# Patient Record
Sex: FEMALE | Race: WHITE
[De-identification: names, ages, dates, MRNs, and addresses within clinical notes are randomized per-mention and may not be internally consistent; named-entity substitution may affect disease eponyms.]

---

## 2018-01-29 ENCOUNTER — HOSPITAL ENCOUNTER (EMERGENCY)
Dept: HOSPITAL 8 - ED | Age: 23
Discharge: HOME | End: 2018-01-29
Payer: MEDICAID

## 2018-01-29 VITALS — HEIGHT: 63 IN | WEIGHT: 156.53 LBS | BODY MASS INDEX: 27.73 KG/M2

## 2018-01-29 VITALS — DIASTOLIC BLOOD PRESSURE: 55 MMHG | SYSTOLIC BLOOD PRESSURE: 103 MMHG

## 2018-01-29 DIAGNOSIS — N30.90: Primary | ICD-10-CM

## 2018-01-29 LAB
ALBUMIN SERPL-MCNC: 3.9 G/DL (ref 3.4–5)
ALP SERPL-CCNC: 85 U/L (ref 45–117)
ALT SERPL-CCNC: 27 U/L (ref 12–78)
ANION GAP SERPL CALC-SCNC: 9 MMOL/L (ref 5–15)
BASOPHILS # BLD AUTO: 0.03 X10^3/UL (ref 0–0.1)
BASOPHILS NFR BLD AUTO: 0 % (ref 0–1)
BILIRUB SERPL-MCNC: 0.4 MG/DL (ref 0.2–1)
CALCIUM SERPL-MCNC: 8.8 MG/DL (ref 8.5–10.1)
CHLORIDE SERPL-SCNC: 105 MMOL/L (ref 98–107)
CREAT SERPL-MCNC: 0.88 MG/DL (ref 0.55–1.02)
CULTURE INDICATED?: YES
EOSINOPHIL # BLD AUTO: 0.13 X10^3/UL (ref 0–0.4)
EOSINOPHIL NFR BLD AUTO: 2 % (ref 1–7)
ERYTHROCYTE [DISTWIDTH] IN BLOOD BY AUTOMATED COUNT: 13.3 % (ref 9.6–15.2)
LYMPHOCYTES # BLD AUTO: 2.19 X10^3/UL (ref 1–3.4)
LYMPHOCYTES NFR BLD AUTO: 27 % (ref 22–44)
MCH RBC QN AUTO: 30.4 PG (ref 27–34.8)
MCHC RBC AUTO-ENTMCNC: 34.2 G/DL (ref 32.4–35.8)
MCV RBC AUTO: 89 FL (ref 80–100)
MD: NO
MICROSCOPIC: (no result)
MONOCYTES # BLD AUTO: 0.56 X10^3/UL (ref 0.2–0.8)
MONOCYTES NFR BLD AUTO: 7 % (ref 2–9)
NEUTROPHILS # BLD AUTO: 5.13 X10^3/UL (ref 1.8–6.8)
NEUTROPHILS NFR BLD AUTO: 64 % (ref 42–75)
PLATELET # BLD AUTO: 306 X10^3/UL (ref 130–400)
PMV BLD AUTO: 8.9 FL (ref 7.4–10.4)
PROT SERPL-MCNC: 8.2 G/DL (ref 6.4–8.2)
RBC # BLD AUTO: 4.79 X10^6/UL (ref 3.82–5.3)

## 2018-01-29 PROCEDURE — 87086 URINE CULTURE/COLONY COUNT: CPT

## 2018-01-29 PROCEDURE — 84703 CHORIONIC GONADOTROPIN ASSAY: CPT

## 2018-01-29 PROCEDURE — 81001 URINALYSIS AUTO W/SCOPE: CPT

## 2018-01-29 PROCEDURE — 36415 COLL VENOUS BLD VENIPUNCTURE: CPT

## 2018-01-29 PROCEDURE — 76830 TRANSVAGINAL US NON-OB: CPT

## 2018-01-29 PROCEDURE — 85025 COMPLETE CBC W/AUTO DIFF WBC: CPT

## 2018-01-29 PROCEDURE — 99285 EMERGENCY DEPT VISIT HI MDM: CPT

## 2018-01-29 PROCEDURE — 74177 CT ABD & PELVIS W/CONTRAST: CPT

## 2018-01-29 PROCEDURE — 80053 COMPREHEN METABOLIC PANEL: CPT

## 2019-10-18 ENCOUNTER — HOSPITAL ENCOUNTER (EMERGENCY)
Facility: MEDICAL CENTER | Age: 24
End: 2019-10-18
Attending: EMERGENCY MEDICINE
Payer: MEDICAID

## 2019-10-18 ENCOUNTER — APPOINTMENT (OUTPATIENT)
Dept: RADIOLOGY | Facility: MEDICAL CENTER | Age: 24
End: 2019-10-18
Attending: EMERGENCY MEDICINE
Payer: MEDICAID

## 2019-10-18 VITALS
BODY MASS INDEX: 26.6 KG/M2 | HEART RATE: 82 BPM | DIASTOLIC BLOOD PRESSURE: 74 MMHG | RESPIRATION RATE: 16 BRPM | TEMPERATURE: 98.4 F | WEIGHT: 150.13 LBS | OXYGEN SATURATION: 97 % | SYSTOLIC BLOOD PRESSURE: 117 MMHG | HEIGHT: 63 IN

## 2019-10-18 DIAGNOSIS — R06.02 SHORTNESS OF BREATH: ICD-10-CM

## 2019-10-18 PROCEDURE — 94640 AIRWAY INHALATION TREATMENT: CPT

## 2019-10-18 PROCEDURE — 71046 X-RAY EXAM CHEST 2 VIEWS: CPT

## 2019-10-18 PROCEDURE — 700101 HCHG RX REV CODE 250

## 2019-10-18 PROCEDURE — 99284 EMERGENCY DEPT VISIT MOD MDM: CPT

## 2019-10-18 PROCEDURE — 94760 N-INVAS EAR/PLS OXIMETRY 1: CPT

## 2019-10-18 RX ADMIN — ALBUTEROL SULFATE 2.5 MG: 2.5 SOLUTION RESPIRATORY (INHALATION) at 16:52

## 2019-10-18 SDOH — HEALTH STABILITY: MENTAL HEALTH: HOW OFTEN DO YOU HAVE A DRINK CONTAINING ALCOHOL?: 2-4 TIMES A MONTH

## 2019-10-18 SDOH — HEALTH STABILITY: MENTAL HEALTH: HOW MANY STANDARD DRINKS CONTAINING ALCOHOL DO YOU HAVE ON A TYPICAL DAY?: 1 OR 2

## 2019-10-18 ASSESSMENT — LIFESTYLE VARIABLES: DO YOU DRINK ALCOHOL: NO

## 2019-10-18 NOTE — ED TRIAGE NOTES
Chief Complaint   Patient presents with   • Difficulty Breathing     Pt was at work and inhaled dust, throat was itching, felt like it was closing. Pt took rhinocort which significant improved symptoms. Speaking in full sentences in triage, no visible respritory distress.

## 2019-10-18 NOTE — FLOWSHEET NOTE
Nebulizer given in ER     10/18/19 8684   RT Assessment of Delivered Medications   Evaluation of Medication Delivery Daily Yes-- Pt /Family has been Instructed in use of Respiratory Medications and Adverse Reactions   SVN Group   #SVN Performed Yes   Given By: Mouthpiece   Chest Exam   Respiration 18   Pulse 75   Breath Sounds   RUL Breath Sounds Clear;Diminished   RML Breath Sounds Diminished   RLL Breath Sounds Diminished   ADAMA Breath Sounds Clear;Diminished   LLL Breath Sounds Diminished   Secretions   Cough Non Productive   Oxygen   Pulse Oximetry 96 %   O2 Daily Delivery Respiratory  Room Air with O2 Available

## 2019-10-18 NOTE — ED PROVIDER NOTES
ED Provider Note    CHIEF COMPLAINT  Chief Complaint   Patient presents with   • Difficulty Breathing     Pt was at work and inhaled dust, throat was itching, felt like it was closing. Pt took rhinocort which significant improved symptoms. Speaking in full sentences in triage, no visible respritory distress.        HPI  Radha Edwards is a 24 y.o. female who presents stating that while she was at work today she inhaled some dust which immediately caused her to cough and feel dyspneic.  She took a nasal inhaler and felt much better.  She denies chest pain.  Patient denies rash.  She denies exposure to any other new medication or other substance    REVIEW OF SYSTEMS  See HPI for further details.  No fever no chills.  No chest pain.  Positive dyspnea.  No nausea or vomiting.  All other systems are negative.    PAST MEDICAL HISTORY  History reviewed. No pertinent past medical history.    FAMILY HISTORY  History reviewed. No pertinent family history.    SOCIAL HISTORY  Social History     Socioeconomic History   • Marital status: Single     Spouse name: Not on file   • Number of children: Not on file   • Years of education: Not on file   • Highest education level: Not on file   Occupational History   • Not on file   Social Needs   • Financial resource strain: Not on file   • Food insecurity:     Worry: Not on file     Inability: Not on file   • Transportation needs:     Medical: Not on file     Non-medical: Not on file   Tobacco Use   • Smoking status: Never Smoker   • Smokeless tobacco: Never Used   Substance and Sexual Activity   • Alcohol use: Yes     Frequency: 2-4 times a month     Drinks per session: 1 or 2   • Drug use: Yes     Types: Inhaled     Comment: seth    • Sexual activity: Not on file   Lifestyle   • Physical activity:     Days per week: Not on file     Minutes per session: Not on file   • Stress: Not on file   Relationships   • Social connections:     Talks on phone: Not on file     Gets together:  "Not on file     Attends Faith service: Not on file     Active member of club or organization: Not on file     Attends meetings of clubs or organizations: Not on file     Relationship status: Not on file   • Intimate partner violence:     Fear of current or ex partner: Not on file     Emotionally abused: Not on file     Physically abused: Not on file     Forced sexual activity: Not on file   Other Topics Concern   • Not on file   Social History Narrative   • Not on file       SURGICAL HISTORY  History reviewed. No pertinent surgical history.    CURRENT MEDICATIONS  Home Medications     Reviewed by Desiree Belle (Pharmacy Tech) on 10/18/19 at 1708  Med List Status: Complete   Medication Last Dose Status   etonogestrel (NEXPLANON) 68 MG Implant implant CONT Active                ALLERGIES  No Known Allergies    PHYSICAL EXAM  VITAL SIGNS: /85   Pulse 75   Temp 36.8 °C (98.2 °F) (Temporal)   Resp 18   Ht 1.6 m (5' 3\")   Wt 68.1 kg (150 lb 2.1 oz)   LMP 09/04/2019 (Within Days)   SpO2 96%   BMI 26.59 kg/m²   Constitutional: Well developed, Well nourished, No acute distress,  HENT: Normocephalic, Atraumatic, Bilateral external ears normal, Oropharynx moist, No oral exudates,  Eyes: DEYSI, EOMI, Conjunctiva normal, No discharge.   Neck: Normal range of motion, No tenderness, Supple, No stridor. No nuchal rigidity  Lymphatic: No lymphadenopathy noted.   Cardiovascular: Regular rate and rhythm  Thorax & Lungs: Minimal expiratory wheezing  Abdomen: Bowel sounds normal, Soft, No tenderness,   Skin: Warm, Dry, No erythema, No rash.   Back: No tenderness, No CVA tenderness.   Extremities: No edema, No tenderness, No cyanosis, No clubbing. Dorsalis pedis pulses 2+ equal bilaterally. Radial pulses 2+ equal bilaterally  Neurologic: Alert & oriented x 3, Normal motor function, Normal sensory function, No focal deficits noted.         RADIOLOGY/PROCEDURES  DX-CHEST-2 VIEWS   Final Result      No " radiographic evidence of acute cardiopulmonary process.            COURSE & MEDICAL DECISION MAKING  Pertinent Labs & Imaging studies reviewed. (See chart for details)  Differential diagnosis includes allergic reaction, pneumothorax, anxiety reaction.    PERC criteria are negative.  Pulse oximetry reading was 95% on room air which is normal.  Pulse was 89.    Patient was better after albuterol nebulizer.  I recommended following up with her regular physician as she may benefit from an albuterol MDI if she has multiple similar visits      FINAL IMPRESSION  1.  Dyspnea  2.   3.      Please note that this dictation was created using voice recognition software. I have worked with consultants from the vendor as well as technical experts from [employer] [employer] to optimize the interface. I have made every reasonable attempt to correct obvious errors, but I expect that there are errors of grammar and possibly content that I did not discover before finalizing the note.      Electronically signed by: Wallace Vazquez, 10/18/2019 4:41 PM

## 2019-10-19 NOTE — ED NOTES
Pt alert and oriented, NAD, VSS, patient verbalized unbderstanding of dc instructions and follow up.

## 2019-11-30 ENCOUNTER — HOSPITAL ENCOUNTER (EMERGENCY)
Facility: MEDICAL CENTER | Age: 24
End: 2019-11-30
Payer: MEDICAID

## 2019-11-30 VITALS
BODY MASS INDEX: 26.17 KG/M2 | SYSTOLIC BLOOD PRESSURE: 127 MMHG | HEIGHT: 63 IN | HEART RATE: 111 BPM | DIASTOLIC BLOOD PRESSURE: 80 MMHG | OXYGEN SATURATION: 97 % | WEIGHT: 147.71 LBS | RESPIRATION RATE: 18 BRPM | TEMPERATURE: 98 F

## 2019-11-30 PROCEDURE — 302449 STATCHG TRIAGE ONLY (STATISTIC)

## 2019-11-30 NOTE — ED TRIAGE NOTES
"Presents complaining of bloody sputum recurring since this AM.  She admits to \"drinking\" last night.  .  Chief Complaint   Patient presents with   • Blood in Sputum   • Nausea     /80   Pulse (!) 111   Temp 36.7 °C (98 °F) (Temporal)   Resp 18   Ht 1.6 m (5' 3\")   Wt 67 kg (147 lb 11.3 oz)   SpO2 97%   BMI 26.17 kg/m²     "

## 2019-11-30 NOTE — ED NOTES
Rounding has been completed.  Pt is made aware that a room will be provided as soon as it becomes available.  Denies any changes since last evaluation.

## 2020-10-31 ENCOUNTER — HOSPITAL ENCOUNTER (EMERGENCY)
Facility: MEDICAL CENTER | Age: 25
End: 2020-10-31
Attending: EMERGENCY MEDICINE

## 2020-10-31 VITALS
BODY MASS INDEX: 23.66 KG/M2 | WEIGHT: 142 LBS | SYSTOLIC BLOOD PRESSURE: 108 MMHG | HEIGHT: 65 IN | HEART RATE: 86 BPM | OXYGEN SATURATION: 100 % | RESPIRATION RATE: 15 BRPM | DIASTOLIC BLOOD PRESSURE: 66 MMHG | TEMPERATURE: 97.6 F

## 2020-10-31 DIAGNOSIS — F19.10 POLYSUBSTANCE ABUSE (HCC): ICD-10-CM

## 2020-10-31 DIAGNOSIS — F10.929 ALCOHOLIC INTOXICATION WITH COMPLICATION (HCC): ICD-10-CM

## 2020-10-31 PROCEDURE — 99283 EMERGENCY DEPT VISIT LOW MDM: CPT

## 2020-10-31 NOTE — ED NOTES
Pt alert and oriented at this time. Reviewed AVS with pt. Pt ambulatory at this time. Appears in no distress. Pt called ride to come get her

## 2020-10-31 NOTE — DISCHARGE INSTRUCTIONS
You accidentally overdosed on a number of recreational drugs  Please understand that you could have  today and I would avoid substance abuse in the future

## 2020-10-31 NOTE — ED PROVIDER NOTES
"ED Provider Note    Scribed for Varinder Aguillon M.D. by Kassie Thomas. 10/31/2020  6:31 AM    Primary care provider: None  Means of arrival: Ambulance  History obtained from: Patient  History limited by: Mental Status Change; Alcohol Intoxication    CHIEF COMPLAINT  •  Alcohol Intoxication    HPI  Radha Sanders is a 25 y.o. female who presents to the Emergency Department by ambulance for alcohol intoxication with an onset of 1 day. Per the patient's sister, the patient went out with friends last night. An ambulance was called as the patient was found lethargic but arousable to voice. She reportedly consumed a significant amount of alcohol as well as methamphetamines, yuri and marijuana. Further medical history is unobtainable secondary to mental status change and alcohol intoxication.      REVIEW OF SYSTEMS  ROS unobtainable secondary to mental status change and alcohol intoxication. See HPI for further details.       PAST MEDICAL HISTORY  No major medical history per sister.      SURGICAL HISTORY  No recent surgeries.      SOCIAL HISTORY  Social History     Tobacco Use   • Smoking status: Never Smoker   • Smokeless tobacco: Never Used   Substance Use Topics   • Alcohol use: Yes     Frequency: 2-4 times a month     Drinks per session: 1 or 2   • Drug use: Yes     Types: Inhaled     Comment: [email or web address]       Social History     Substance and Sexual Activity   Drug Use Yes   • Types: Inhaled    Comment: seth    Accompanied by sister.      FAMILY HISTORY  History reviewed. No pertinent family history.       CURRENT MEDICATIONS  Home Medications    **Home medications have not yet been reviewed for this encounter**         ALLERGIES  None      PHYSICAL EXAM  VITAL SIGNS: /69   Pulse 95   Temp 36.4 °C (97.6 °F) (Temporal)   Resp 15   Ht 1.651 m (5' 5\")   Wt 64.4 kg (142 lb)   SpO2 95%   BMI 23.63 kg/m²       Constitutional: Smells of alcohol, Intoxicated, No acute distress.  HENT: " Normocephalic, Atraumatic, Bilateral external ears normal, Oropharynx is clear mucous membranes are moist. No oral exudates or nasal discharge.   Eyes: Pupils are equal round and reactive, EOMI, Conjunctiva normal, No discharge.   Neck: Normal range of motion, No tenderness, Supple, No stridor. No meningismus.  Lymphatic: No lymphadenopathy noted.   Cardiovascular: Regular rate and rhythm without murmur rub or gallop.  Thorax & Lungs: Clear breath sounds bilaterally without wheezes, rhonchi or rales. There is no chest wall tenderness.   Abdomen: Soft non-tender non-distended. There is no rebound or guarding. No organomegaly is appreciated. Bowel sounds are normal.  Skin: Normal without rash.   Back: No CVA or spinal tenderness.   Extremities: Intact distal pulses, No edema, No tenderness, No cyanosis, No clubbing. Capillary refill is less than 2 seconds.  Musculoskeletal: Good range of motion in all major joints. No tenderness to palpation or major deformities noted.   Neurologic: Drowsy, Intoxicated, Able to follow commands and sit forward, Normal motor function, Normal sensory function, No focal deficits noted. Reflexes are normal.  Psychiatric: Intoxicated. Difficult to assess.      COURSE & MEDICAL DECISION MAKING  Nursing notes, VS, PMSFHx reviewed in chart.    6:31 AM Patient seen and examined at bedside. Plan to continue monitoring until sober.  She does not exhibit anything more than a toxidrome more associated with alcohol then stimulants and she is able to follow commands and reposition herself and set up to take a drink of juice    10:21 AM On repeat evaluation, patient is resting comfortably. She is arousable to voice. She is sober and speaking in full sentences. Plan to contact the patient's sister and discharge the patient home.  Patient is much improved and is counseled to stop using so much alcohol and drugs    Discharge plan was discussed with the patient and includes following up with her PCP. The  "patient will return for new or persisting symptoms.  The patient verbalizes understanding and will comply.  Patient is stable at the time of discharge. Vital signs were reviewed: /60   Pulse 91   Temp 36.4 °C (97.6 °F) (Temporal)   Resp 15   Ht 1.651 m (5' 5\")   Wt 64.4 kg (142 lb)   SpO2 100%   BMI 23.63 kg/m²        DISPOSITION  Patient will be discharged home in stable condition.      FINAL IMPRESSION  1. Alcoholic intoxication with complication (HCC)    2. Polysubstance abuse (HCC)         Kassie BERMEO (Scribe), am scribing for, and in the presence of, Varinder Aguillon M.D..    Electronically signed by: Kassie Thomas (Scribe), 10/31/2020    Varinder BERMEO M.D. personally performed the services described in this documentation, as scribed by Kassie Thomas in my presence, and it is both accurate and complete. C.      The note accurately reflects work and decisions made by me.  Varinder Aguillon M.D.  10/31/2020  10:37 AM        "

## 2023-07-14 ENCOUNTER — HOSPITAL ENCOUNTER (EMERGENCY)
Facility: MEDICAL CENTER | Age: 28
End: 2023-07-14
Attending: EMERGENCY MEDICINE
Payer: MEDICAID

## 2023-07-14 VITALS
OXYGEN SATURATION: 99 % | TEMPERATURE: 96.8 F | WEIGHT: 146.83 LBS | HEART RATE: 93 BPM | DIASTOLIC BLOOD PRESSURE: 91 MMHG | BODY MASS INDEX: 26.02 KG/M2 | RESPIRATION RATE: 18 BRPM | HEIGHT: 63 IN | SYSTOLIC BLOOD PRESSURE: 136 MMHG

## 2023-07-14 DIAGNOSIS — K04.7 DENTAL INFECTION: ICD-10-CM

## 2023-07-14 PROCEDURE — A9270 NON-COVERED ITEM OR SERVICE: HCPCS | Performed by: EMERGENCY MEDICINE

## 2023-07-14 PROCEDURE — 700102 HCHG RX REV CODE 250 W/ 637 OVERRIDE(OP): Performed by: EMERGENCY MEDICINE

## 2023-07-14 PROCEDURE — 99283 EMERGENCY DEPT VISIT LOW MDM: CPT

## 2023-07-14 RX ORDER — OXYCODONE HYDROCHLORIDE AND ACETAMINOPHEN 5; 325 MG/1; MG/1
1 TABLET ORAL ONCE
Status: COMPLETED | OUTPATIENT
Start: 2023-07-14 | End: 2023-07-14

## 2023-07-14 RX ORDER — AMOXICILLIN AND CLAVULANATE POTASSIUM 875; 125 MG/1; MG/1
1 TABLET, FILM COATED ORAL ONCE
Status: COMPLETED | OUTPATIENT
Start: 2023-07-14 | End: 2023-07-14

## 2023-07-14 RX ORDER — DEXAMETHASONE 4 MG/1
8 TABLET ORAL ONCE
Status: COMPLETED | OUTPATIENT
Start: 2023-07-14 | End: 2023-07-14

## 2023-07-14 RX ORDER — AMOXICILLIN AND CLAVULANATE POTASSIUM 875; 125 MG/1; MG/1
1 TABLET, FILM COATED ORAL 2 TIMES DAILY
Qty: 14 TABLET | Refills: 0 | Status: ACTIVE | OUTPATIENT
Start: 2023-07-14 | End: 2023-07-21

## 2023-07-14 RX ADMIN — DEXAMETHASONE 8 MG: 4 TABLET ORAL at 18:10

## 2023-07-14 RX ADMIN — OXYCODONE HYDROCHLORIDE AND ACETAMINOPHEN 1 TABLET: 5; 325 TABLET ORAL at 18:10

## 2023-07-14 RX ADMIN — AMOXICILLIN AND CLAVULANATE POTASSIUM 1 TABLET: 875; 125 TABLET, FILM COATED ORAL at 18:10

## 2023-07-15 NOTE — ED NOTES
Discharge instructions reviewed with patient and signed. IV was removed from arm. They were instructed on how to  prescriptions. They verbalized understanding of follow up instructions. All belongings with patient. They ambulate with a steady gait. Significant other is driving her home

## 2023-07-15 NOTE — ED PROVIDER NOTES
"ED Provider Note    CHIEF COMPLAINT  Chief Complaint   Patient presents with    Dental Pain     Pt reports her R front tooth is broken and she has had gum/inner lip swelling/pain since last night.  Pt taking IBU for pain, last dose 0900 this morning.       EXTERNAL RECORDS REVIEWED  No old notes for evaluation    HPI/ROS      Radha Sanders is a 28 y.o. female who presents planing of right upper tooth dental pain.  Its in the middle of her mouth and is central incisor to the right with pain radiation by here Jaime up by her eye.  She states he thinks he has an infection.  Had a broken tooth while she was in detention and they pulled it out before and she states this feels similar.  Denies fever, shakes, chills, sweats, difficulty swallowing or current dental care.    PAST MEDICAL HISTORY       SURGICAL HISTORY  patient denies any surgical history    FAMILY HISTORY  No family history on file.    SOCIAL HISTORY  Social History     Tobacco Use    Smoking status: Never    Smokeless tobacco: Never   Vaping Use    Vaping Use: Never used   Substance and Sexual Activity    Alcohol use: Yes    Drug use: Yes     Types: Inhaled     Comment: seth     Sexual activity: Not on file       CURRENT MEDICATIONS  Home Medications       Reviewed by Nikki Ortega R.N. (Registered Nurse) on 07/14/23 at 1728  Med List Status: Complete     Medication Last Dose Status        Patient Tyler Taking any Medications                           ALLERGIES  No Known Allergies    PHYSICAL EXAM  VITAL SIGNS: BP (!) 136/91   Pulse 93   Temp 36 °C (96.8 °F)   Resp 18   Ht 1.6 m (5' 3\")   Wt 66.6 kg (146 lb 13.2 oz)   LMP 07/08/2023 (Approximate)   SpO2 99%   BMI 26.01 kg/m²      Constitutional: Well developed, Well nourished, No acute distress, Non-toxic appearance.   HENT: Normocephalic, Atraumatic, or dentition with the central incisor and pericentral incisor slight cracking of the tooth with evidence of slight surrounding " erythema, no discharge, no abscess Eyes: PERRLA, EOMI, Conjunctiva normal, No discharge.   Skin: Warm, Dry, No erythema, No rash.   Neurologic: Alert & oriented x 3,  No focal deficits noted, normal gait.      DIAGNOSTIC STUDIES / PROCEDURES      COURSE & MEDICAL DECISION MAKING    ED Observation Status? No; Patient does not meet criteria for ED Observation.     INITIAL ASSESSMENT, COURSE AND PLAN  Care Narrative: This is a pleasant 28-year-old female presents with dental infection and dental caries.  The patient has no evidence of abscess requiring I&D, no active cellulitis, no ocular involvement.  She received Percocet here, Augmentin and Decadron.  I prescribed her Augmentin for outpatient care.  She understands the need to follow-up with a dentist for further evaluation and possible dental removal.  Strict return precautions have been given.  She has no evidence of respiratory compromise.        ADDITIONAL PROBLEM LIST  Previous methamphetamine use resulting in poor dentition  DISPOSITION AND DISCUSSIONS      Escalation of care considered, and ultimately not performed:acute inpatient care management, however at this time, the patient is most appropriate for outpatient management    Barriers to care at this time, including but not limited to: Patient does not have established PCP.     Decision tools and prescription drugs considered including, but not limited to: Consider Panorex or CT scan maxillofacial but here the patient has no evidence of abscess requiring further radiological evaluation.    FINAL DIAGNOSIS  1. Dental infection        DISPOSITION:  Patient will be discharged home in stable condition.    FOLLOW UP:  [facility], Emergency Dept  [address]  [facility] [address] [phone]    If symptoms worsen    On license of [facility]  [address] [facility] [address]  [facility] [address]  Schedule an appointment as soon as possible for a visit         OUTPATIENT MEDICATIONS:  Discharge  Medication List as of 7/14/2023  6:28 PM        START taking these medications    Details   amoxicillin-clavulanate (AUGMENTIN) 875-125 MG Tab Take 1 Tablet by mouth 2 times a day for 7 days., Disp-14 Tablet, R-0, Normal               Electronically signed by: Ghassan Zepeda D.O., 7/14/2023 5:49 PM

## 2023-07-15 NOTE — ED TRIAGE NOTES
"Radha Edwards Sanders ambulatory to triage for     Chief Complaint   Patient presents with    Dental Pain     Pt reports her R front tooth is broken and she has had gum/inner lip swelling/pain since last night.  Pt taking IBU for pain, last dose 0900 this morning.     Pt requesting dental referral.  NAD, VSS.  BP (!) 143/87   Pulse 98   Temp 36 °C (96.8 °F) (Temporal)   Resp 18   Ht 1.6 m (5' 3\")   Wt 66.6 kg (146 lb 13.2 oz)   LMP 07/08/2023 (Approximate)   SpO2 99%   BMI 26.01 kg/m²     "

## 2023-07-15 NOTE — DISCHARGE INSTRUCTIONS
Follow up with a dentist is mandatory.  Take all of the antibiotics.  OTC Ibuprofen. Return to [facility] on [facility] as we do not have ENT or Oral Maxillofacial Surgeon on call this facility for fever over 101.5, significant facial swelling, difficulty breathing or swallowing or inability to open the mouth fully.    Dental Caries   Dental caries (tooth decay) is the most common of all oral diseases. It is particularly important to deal with it from your child's 1st to 12th year of life. In these years, the baby teeth erupt and are replaced by the permanent teeth. The permanent teeth, excluding the 3rd molars, erupt (come out) into their working position.  For early treatment of dental caries, your child should get their first dental checkup between one and one-half and two years of age. This is important before any extensive cavities are established.  Dental caries often appears as a white chalky area on the enamel. It later softens, and then the tooth structure breaks down. If not treated right away, it progresses towards the pulp. It will then require more extensive treatment to save the tooth.  PREVENTION  In children, dentistry is mainly used for the prevention of dental cavities.   Sealants can help with prevention of cavities. Sealants are composite resins applied onto surfaces of teeth at risk for decay. They smooth out the fissures and pits, and prevent food entrapment that causes decay.   Fluoride tablets may also be prescribed in the children over 3 years of age, if your drinking water is not fluoridated. The fluoride absorbed by the tooth enamel makes them less susceptible to caries.   Thorough daily cleaning with a toothbrush and dental floss is the best way to prevent cavities.   Regular visits with a dentist for check-ups and cleanings is also very important.   SEEK IMMEDIATE MEDICAL ATTENTION IF:  You develop a fever over 102° F (38° C).   You develop redness and swelling of  your face, jaw, or neck.   You are unable to open your mouth.   You have severe pain uncontrolled by pain medicine.   Document Released: 09/09/2003 Document Re-Released: 03/14/2011  [employer]® Patient Information ©2011 [employer], [place].

## 2023-07-16 ENCOUNTER — HOSPITAL ENCOUNTER (EMERGENCY)
Facility: MEDICAL CENTER | Age: 28
End: 2023-07-16
Attending: EMERGENCY MEDICINE
Payer: MEDICAID

## 2023-07-16 VITALS
TEMPERATURE: 98 F | HEIGHT: 63 IN | SYSTOLIC BLOOD PRESSURE: 130 MMHG | WEIGHT: 142 LBS | RESPIRATION RATE: 17 BRPM | OXYGEN SATURATION: 98 % | DIASTOLIC BLOOD PRESSURE: 85 MMHG | BODY MASS INDEX: 25.16 KG/M2 | HEART RATE: 80 BPM

## 2023-07-16 DIAGNOSIS — K04.7 PERIAPICAL ABSCESS: ICD-10-CM

## 2023-07-16 PROCEDURE — 700102 HCHG RX REV CODE 250 W/ 637 OVERRIDE(OP): Mod: UD | Performed by: EMERGENCY MEDICINE

## 2023-07-16 PROCEDURE — 99283 EMERGENCY DEPT VISIT LOW MDM: CPT

## 2023-07-16 PROCEDURE — A9270 NON-COVERED ITEM OR SERVICE: HCPCS | Mod: UD | Performed by: EMERGENCY MEDICINE

## 2023-07-16 PROCEDURE — 303977 HCHG I & D

## 2023-07-16 RX ORDER — OXYCODONE HYDROCHLORIDE AND ACETAMINOPHEN 5; 325 MG/1; MG/1
1 TABLET ORAL ONCE
Status: COMPLETED | OUTPATIENT
Start: 2023-07-16 | End: 2023-07-16

## 2023-07-16 RX ADMIN — BENZOCAINE, BUTAMBEN, AND TETRACAINE HYDROCHLORIDE 1 SPRAY: .028; .004; .004 AEROSOL, SPRAY TOPICAL at 17:48

## 2023-07-16 RX ADMIN — OXYCODONE AND ACETAMINOPHEN 1 TABLET: 5; 325 TABLET ORAL at 17:48

## 2023-07-17 NOTE — DISCHARGE INSTRUCTIONS
To the antibiotics.  Take Motrin and Tylenol as needed for pain control.  Follow-up with the dentist as discussed.  Return for increased pain or swelling

## 2023-07-17 NOTE — ED PROVIDER NOTES
"ED Provider Note    CHIEF COMPLAINT  Chief Complaint   Patient presents with    Dental Pain     Front upper tooth the started 4 days ago  She came in 3 days ago and was prescribed antibiotic    But the swelling of the upper lip got worsen and the pain increased and radiating to the nasal area  Denied any fever       HPI/ROS    Radha Sanders is a 28 y.o. female who presents with dental pain.  The patient states that started out 4 days ago.  She was evaluated here on the 14th and I reviewed the record and she was started on Augmentin.  She had increased pain and swelling to the upper lip as well as the upper gingival region around the upper incisors.  She has not had any associated fevers nor vomiting.    PAST MEDICAL HISTORY       SURGICAL HISTORY  patient denies any surgical history    FAMILY HISTORY  History reviewed. No pertinent family history.    SOCIAL HISTORY  Social History     Tobacco Use    Smoking status: Never    Smokeless tobacco: Never   Vaping Use    Vaping Use: Never used   Substance and Sexual Activity    Alcohol use: Yes    Drug use: Yes     Types: Inhaled     Comment: seth     Sexual activity: Not on file       CURRENT MEDICATIONS  Home Medications       Reviewed by Gregorio Arias R.N. (Registered Nurse) on 07/16/23 at 1725  Med List Status: Partial     Medication Last Dose Status   amoxicillin-clavulanate (AUGMENTIN) 875-125 MG Tab  Active                    ALLERGIES  No Known Allergies    PHYSICAL EXAM  VITAL SIGNS: /86   Pulse 92   Temp 36.7 °C (98.1 °F) (Temporal)   Resp 18   Ht 1.6 m (5' 3\")   Wt 64.4 kg (142 lb)   LMP 07/08/2023 (Approximate)   SpO2 98%   BMI 25.15 kg/m²    In general the patient does not appear toxic    Oral cavity the patient does have gingival swelling and tenderness above the upper right incisor that is partially fractured with decay.  This incisor does have significant pain with percussion.    Neck is supple without " adenopathy    Pulmonary chest is clear auscultation bilaterally    Neovascular S1-S2 with a regular rate and rhythm    PROCEDURES vision and drainage  Cetacaine was utilized for topical anesthetic.  Then used a #11 blade to incise into the periapical abscess of the right upper incisor.  There is a mild amount of purulent drainage.  The patient did tolerated the procedure well.  Of note the patient also received Percocet prior to the procedure.    COURSE & MEDICAL DECISION MAKING  This a 28-year-old female who presents with a periapical abscess to the right upper incisor.  Incision and drainage was performed with a significant mount of purulent drainage.  The patient is aware she will require definitive treatment for the fractured and decay of the upper incisor.  I will refer the patient to Dr. Downing as the patient only has Medicaid for coverage.  She will continue the Augmentin and return for increased pain or swelling.    FINAL DIAGNOSIS  1.  Periapical abscess right upper incisor  2.  Incision and drainage  3.  Fractured right upper incisor with decay    Disposition  Patient will be discharged in stable condition       Electronically signed by: Calos Gibbons M.D., 7/16/2023 5:43 PM

## 2023-07-17 NOTE — ED TRIAGE NOTES
Chief Complaint   Patient presents with    Dental Pain     Front upper tooth the started 4 days ago  She came in 3 days ago and was prescribed antibiotic    But the swelling of the upper lip got worsen and the pain increased and radiating to the nasal area  Denied any fever     Pt came in to triage ambulatory with steady gait for above complaints.     Pt is alert and oriented x 4, speaking in full sentences, follows commands and responds appropriately to questions.     Respirations are even and unlabored.    Pt placed in lobby. Pt educated on triage process. Pt encouraged to inform staff for any changes in condition or if needs help    Will continue to monitor for any complications while waiting to be room in.    Vitals:    07/16/23 1717   BP: 125/86   Pulse: 92   Resp: 18   Temp: 36.7 °C (98.1 °F)   SpO2: 98%

## 2023-10-22 ENCOUNTER — HOSPITAL ENCOUNTER (EMERGENCY)
Facility: MEDICAL CENTER | Age: 28
End: 2023-10-22
Attending: EMERGENCY MEDICINE
Payer: MEDICAID

## 2023-10-22 VITALS
OXYGEN SATURATION: 98 % | HEIGHT: 63 IN | SYSTOLIC BLOOD PRESSURE: 101 MMHG | WEIGHT: 153.22 LBS | TEMPERATURE: 97.8 F | HEART RATE: 81 BPM | RESPIRATION RATE: 16 BRPM | BODY MASS INDEX: 27.15 KG/M2 | DIASTOLIC BLOOD PRESSURE: 58 MMHG

## 2023-10-22 DIAGNOSIS — R11.2 NAUSEA AND VOMITING, UNSPECIFIED VOMITING TYPE: ICD-10-CM

## 2023-10-22 LAB
ALBUMIN SERPL BCP-MCNC: 4.5 G/DL (ref 3.2–4.9)
ALBUMIN/GLOB SERPL: 1.3 G/DL
ALP SERPL-CCNC: 79 U/L (ref 30–99)
ALT SERPL-CCNC: 51 U/L (ref 2–50)
ANION GAP SERPL CALC-SCNC: 12 MMOL/L (ref 7–16)
AST SERPL-CCNC: 50 U/L (ref 12–45)
BASOPHILS # BLD AUTO: 0.6 % (ref 0–1.8)
BASOPHILS # BLD: 0.03 K/UL (ref 0–0.12)
BILIRUB SERPL-MCNC: 0.3 MG/DL (ref 0.1–1.5)
BUN SERPL-MCNC: 10 MG/DL (ref 8–22)
CALCIUM ALBUM COR SERPL-MCNC: 8.4 MG/DL (ref 8.5–10.5)
CALCIUM SERPL-MCNC: 8.8 MG/DL (ref 8.5–10.5)
CHLORIDE SERPL-SCNC: 104 MMOL/L (ref 96–112)
CO2 SERPL-SCNC: 22 MMOL/L (ref 20–33)
CREAT SERPL-MCNC: 0.53 MG/DL (ref 0.5–1.4)
EOSINOPHIL # BLD AUTO: 0.02 K/UL (ref 0–0.51)
EOSINOPHIL NFR BLD: 0.4 % (ref 0–6.9)
ERYTHROCYTE [DISTWIDTH] IN BLOOD BY AUTOMATED COUNT: 41.2 FL (ref 35.9–50)
GFR SERPLBLD CREATININE-BSD FMLA CKD-EPI: 129 ML/MIN/1.73 M 2
GLOBULIN SER CALC-MCNC: 3.4 G/DL (ref 1.9–3.5)
GLUCOSE SERPL-MCNC: 95 MG/DL (ref 65–99)
HCG SERPL QL: NEGATIVE
HCT VFR BLD AUTO: 40.9 % (ref 37–47)
HGB BLD-MCNC: 13.7 G/DL (ref 12–16)
IMM GRANULOCYTES # BLD AUTO: 0.03 K/UL (ref 0–0.11)
IMM GRANULOCYTES NFR BLD AUTO: 0.6 % (ref 0–0.9)
LIPASE SERPL-CCNC: 25 U/L (ref 11–82)
LYMPHOCYTES # BLD AUTO: 0.9 K/UL (ref 1–4.8)
LYMPHOCYTES NFR BLD: 16.9 % (ref 22–41)
MCH RBC QN AUTO: 28.5 PG (ref 27–33)
MCHC RBC AUTO-ENTMCNC: 33.5 G/DL (ref 32.2–35.5)
MCV RBC AUTO: 85 FL (ref 81.4–97.8)
MONOCYTES # BLD AUTO: 0.27 K/UL (ref 0–0.85)
MONOCYTES NFR BLD AUTO: 5.1 % (ref 0–13.4)
NEUTROPHILS # BLD AUTO: 4.08 K/UL (ref 1.82–7.42)
NEUTROPHILS NFR BLD: 76.4 % (ref 44–72)
NRBC # BLD AUTO: 0 K/UL
NRBC BLD-RTO: 0 /100 WBC (ref 0–0.2)
PLATELET # BLD AUTO: 321 K/UL (ref 164–446)
PMV BLD AUTO: 9.5 FL (ref 9–12.9)
POTASSIUM SERPL-SCNC: 3.8 MMOL/L (ref 3.6–5.5)
PROT SERPL-MCNC: 7.9 G/DL (ref 6–8.2)
RBC # BLD AUTO: 4.81 M/UL (ref 4.2–5.4)
SODIUM SERPL-SCNC: 138 MMOL/L (ref 135–145)
WBC # BLD AUTO: 5.3 K/UL (ref 4.8–10.8)

## 2023-10-22 PROCEDURE — 84703 CHORIONIC GONADOTROPIN ASSAY: CPT

## 2023-10-22 PROCEDURE — 99284 EMERGENCY DEPT VISIT MOD MDM: CPT

## 2023-10-22 PROCEDURE — 85025 COMPLETE CBC W/AUTO DIFF WBC: CPT

## 2023-10-22 PROCEDURE — 36415 COLL VENOUS BLD VENIPUNCTURE: CPT

## 2023-10-22 PROCEDURE — 700111 HCHG RX REV CODE 636 W/ 250 OVERRIDE (IP): Mod: UD

## 2023-10-22 PROCEDURE — 83690 ASSAY OF LIPASE: CPT

## 2023-10-22 PROCEDURE — 700105 HCHG RX REV CODE 258: Mod: UD | Performed by: EMERGENCY MEDICINE

## 2023-10-22 PROCEDURE — 80053 COMPREHEN METABOLIC PANEL: CPT

## 2023-10-22 RX ORDER — SODIUM CHLORIDE 9 MG/ML
1000 INJECTION, SOLUTION INTRAVENOUS ONCE
Status: COMPLETED | OUTPATIENT
Start: 2023-10-22 | End: 2023-10-22

## 2023-10-22 RX ORDER — ONDANSETRON 4 MG/1
4 TABLET, ORALLY DISINTEGRATING ORAL ONCE
Status: COMPLETED | OUTPATIENT
Start: 2023-10-22 | End: 2023-10-22

## 2023-10-22 RX ADMIN — SODIUM CHLORIDE 1000 ML: 9 INJECTION, SOLUTION INTRAVENOUS at 13:25

## 2023-10-22 RX ADMIN — ONDANSETRON 4 MG: 4 TABLET, ORALLY DISINTEGRATING ORAL at 12:16

## 2023-10-22 NOTE — ED TRIAGE NOTES
Chief Complaint   Patient presents with    N/V     Last emesis 45 minutes ago. Pt admits to ETOH last night.       Pt ambulate to triage with above complaint.   Pt educated on triage process and returned to lobby.

## 2023-10-22 NOTE — ED PROVIDER NOTES
"ED Provider Note    CHIEF COMPLAINT  Chief Complaint   Patient presents with    N/V     Last emesis 45 minutes ago. Pt admits to ETOH last night.        EXTERNAL RECORDS REVIEWED  Inpatient Notes ED note 7/16/23    HPI/ROS  LIMITATION TO HISTORY   Select: : None  OUTSIDE HISTORIAN(S):  None    Radha Sanders is a 28 y.o. female who presents to the emergency department for evaluation of nausea and vomiting.  The patient states that she went out drinking with her friends last night.  She states that she woke up this morning around 8 AM and started having persistent vomiting.  She had at least 8-10 episodes of nonbloody nonbilious emesis.  She states that her abdomen is sore but she denies any other pain.  She has not had any diarrhea and did have a normal bowel movement this morning.  She denies any fevers.  She has not had any chest pain or shortness of breath.  She denies any runny nose, cough, congestion, or difficulty breathing.  She does not take any daily medications and has had no previous abdominal surgeries.  She denies any other drug use.    PAST MEDICAL HISTORY  None    SURGICAL HISTORY  patient denies any surgical history    FAMILY HISTORY  History reviewed. No pertinent family history.    SOCIAL HISTORY  Social History     Tobacco Use    Smoking status: Never    Smokeless tobacco: Never   Vaping Use    Vaping Use: Never used   Substance and Sexual Activity    Alcohol use: Yes    Drug use: Yes     Types: Inhaled     Comment: seth     Sexual activity: Not on file       CURRENT MEDICATIONS  Home Medications       Reviewed by Shyann Soni R.N. (Registered Nurse) on 10/22/23 at 1213  Med List Status: Partial     Medication Last Dose Status        Patient Tyler Taking any Medications                           ALLERGIES  No Known Allergies    PHYSICAL EXAM  VITAL SIGNS: /58   Pulse 81   Temp 36.4 °C (97.5 °F) (Temporal)   Resp 16   Ht 1.6 m (5' 3\")   Wt 69.5 kg (153 lb 3.5 " oz)   SpO2 98%   BMI 27.14 kg/m²   Constitutional: Alert and in no apparent distress.  HENT: Normocephalic atraumatic. Bilateral external ears normal. Nose normal. Mucous membranes are dry.  Eyes: Pupils are equal and reactive. Conjunctiva normal. Non-icteric sclera.   Neck: Normal range of motion without tenderness. Supple. No meningeal signs.  Cardiovascular: Regular rate and rhythm. No murmurs, gallops or rubs.  Thorax & Lungs: No increased work of breathing. Breath sounds are clear to auscultation bilaterally. No wheezing, rhonchi or rales.  Abdomen: Soft, nontender and nondistended. No hepatosplenomegaly.  Skin: Warm and dry. No rashes are noted.  Back: No bony tenderness, No CVA tenderness.   Extremities: 2+ peripheral pulses. Cap refill is less than 2 seconds. No edema, cyanosis, or clubbing.  Musculoskeletal: Good range of motion in all major joints. No tenderness to palpation or major deformities noted.   Neurologic: Alert and appropriate for age. The patient moves all 4 extremities without obvious deficits.    DIAGNOSTIC STUDIES / PROCEDURES    LABS  Results for orders placed or performed during the hospital encounter of 10/22/23   CBC WITH DIFFERENTIAL   Result Value Ref Range    WBC 5.3 4.8 - 10.8 K/uL    RBC 4.81 4.20 - 5.40 M/uL    Hemoglobin 13.7 12.0 - 16.0 g/dL    Hematocrit 40.9 37.0 - 47.0 %    MCV 85.0 81.4 - 97.8 fL    MCH 28.5 27.0 - 33.0 pg    MCHC 33.5 32.2 - 35.5 g/dL    RDW 41.2 35.9 - 50.0 fL    Platelet Count 321 164 - 446 K/uL    MPV 9.5 9.0 - 12.9 fL    Neutrophils-Polys 76.40 (H) 44.00 - 72.00 %    Lymphocytes 16.90 (L) 22.00 - 41.00 %    Monocytes 5.10 0.00 - 13.40 %    Eosinophils 0.40 0.00 - 6.90 %    Basophils 0.60 0.00 - 1.80 %    Immature Granulocytes 0.60 0.00 - 0.90 %    Nucleated RBC 0.00 0.00 - 0.20 /100 WBC    Neutrophils (Absolute) 4.08 1.82 - 7.42 K/uL    Lymphs (Absolute) 0.90 (L) 1.00 - 4.80 K/uL    Monos (Absolute) 0.27 0.00 - 0.85 K/uL    Eos (Absolute) 0.02 0.00 -  0.51 K/uL    Baso (Absolute) 0.03 0.00 - 0.12 K/uL    Immature Granulocytes (abs) 0.03 0.00 - 0.11 K/uL    NRBC (Absolute) 0.00 K/uL   COMP METABOLIC PANEL   Result Value Ref Range    Sodium 138 135 - 145 mmol/L    Potassium 3.8 3.6 - 5.5 mmol/L    Chloride 104 96 - 112 mmol/L    Co2 22 20 - 33 mmol/L    Anion Gap 12.0 7.0 - 16.0    Glucose 95 65 - 99 mg/dL    Bun 10 8 - 22 mg/dL    Creatinine 0.53 0.50 - 1.40 mg/dL    Calcium 8.8 8.5 - 10.5 mg/dL    Correct Calcium 8.4 (L) 8.5 - 10.5 mg/dL    AST(SGOT) 50 (H) 12 - 45 U/L    ALT(SGPT) 51 (H) 2 - 50 U/L    Alkaline Phosphatase 79 30 - 99 U/L    Total Bilirubin 0.3 0.1 - 1.5 mg/dL    Albumin 4.5 3.2 - 4.9 g/dL    Total Protein 7.9 6.0 - 8.2 g/dL    Globulin 3.4 1.9 - 3.5 g/dL    A-G Ratio 1.3 g/dL   LIPASE   Result Value Ref Range    Lipase 25 11 - 82 U/L   HCG QUAL SERUM   Result Value Ref Range    Beta-Hcg Qualitative Serum Negative Negative   ESTIMATED GFR   Result Value Ref Range    GFR (CKD-EPI) 129 >60 mL/min/1.73 m 2     COURSE & MEDICAL DECISION MAKING    ED Observation Status? Yes; I am placing the patient in to an observation status due to a diagnostic uncertainty as well as therapeutic intensity. Patient placed in observation status at 1:16 PM, 10/22/2023.     Observation plan is as follows: Labs, IV hydration and reassessment    Upon Reevaluation, the patient's condition has: Improved; and will be discharged.    Patient discharged from ED Observation status at 2:27 PM (Time) 10/22/23 (Date).     INITIAL ASSESSMENT, COURSE AND PLAN  Care Narrative: This is a 28-year-old female presenting to the emergency department for evaluation of nausea and vomiting.  On initial evaluation, the patient appeared well and in no acute distress.  Her vital signs were reassuring.  Physical exam was reassuring as well with no abdominal tenderness to palpation or distention.  I have very low clinical suspicion for acute appendicitis, obstruction, perforation, or other surgical  emergency.  She did have dry mucous membranes and given her persistent vomiting the plan was made to initiate an IV.  She had received Zofran p.o. in triage.    Patient's white count was normal and reassuring.  She denied any fevers and her abdominal exam was completely benign.  I have very low clinical suspicion for serious infectious etiology at this point.  Her electrolytes were without any derangement.  LFTs were minimally bumped with an AST of 50 and an ALT of 51.  Her lipase was normal.  I am less concerned for acute pancreatitis or hepatobiliary disease.    The patient was treated with IV fluids and Zofran.  Upon reassessment her vital signs were normal.  She has tolerated an oral challenge.  I suspect her clinical presentation is consistent with her heavy alcohol intake last night.  I do think she stable for discharge at this time.  I encouraged her to follow-up with her primary care physician and to return to the emergency department with any worsening signs or symptoms.    HYDRATION: Based on the patient's presentation of Acute Vomiting the patient was given IV fluids. IV Hydration was used because oral hydration was not adequate alone. Upon recheck following hydration, the patient was improved.      ADDITIONAL PROBLEM LIST  Nausea and vomiting  DISPOSITION AND DISCUSSIONS  I have discussed management of the patient with the following physicians and SAWYER's:  None    Discussion of management with other [facility] or appropriate source(s): None     Escalation of care considered, and ultimately not performed:acute inpatient care management, however at this time, the patient is most appropriate for outpatient management    Barriers to care at this time, including but not limited to:  None .     Decision tools and prescription drugs considered including, but not limited to:  None .    FINAL IMPRESSION  1. Nausea and vomiting, unspecified vomiting type      PRESCRIPTIONS  New Prescriptions    No medications on file      FOLLOW UP  [facility]  [address] [facility] [address]  [phone]  Call in 1 day  To schedule a follow up appointment    [facility], Emergency Dept  [address] [facility] [id number] [phone]  Go to   As needed    -DISCHARGE-    Electronically signed by: Kristy Rivera D.O., 10/22/2023 1:10 PM